# Patient Record
Sex: FEMALE | Race: WHITE
[De-identification: names, ages, dates, MRNs, and addresses within clinical notes are randomized per-mention and may not be internally consistent; named-entity substitution may affect disease eponyms.]

---

## 2018-07-09 VITALS — TEMPERATURE: 98.2 F | HEART RATE: 88 BPM | SYSTOLIC BLOOD PRESSURE: 121 MMHG | DIASTOLIC BLOOD PRESSURE: 56 MMHG

## 2018-07-25 VITALS — TEMPERATURE: 97.1 F | HEART RATE: 88 BPM | SYSTOLIC BLOOD PRESSURE: 131 MMHG | DIASTOLIC BLOOD PRESSURE: 73 MMHG

## 2018-08-22 VITALS — DIASTOLIC BLOOD PRESSURE: 75 MMHG | TEMPERATURE: 99.2 F | SYSTOLIC BLOOD PRESSURE: 118 MMHG | HEART RATE: 104 BPM

## 2018-09-19 VITALS — SYSTOLIC BLOOD PRESSURE: 99 MMHG | DIASTOLIC BLOOD PRESSURE: 41 MMHG | HEART RATE: 98 BPM | TEMPERATURE: 98.1 F

## 2018-10-03 ENCOUNTER — HOSPITAL ENCOUNTER (OUTPATIENT)
Dept: HOSPITAL 19 - EUO | Age: 78
LOS: 4 days | Discharge: HOME | End: 2018-10-07
Attending: NURSE PRACTITIONER
Payer: MEDICARE

## 2018-10-03 VITALS — WEIGHT: 175.27 LBS | HEIGHT: 60 IN | BODY MASS INDEX: 34.41 KG/M2

## 2018-10-03 DIAGNOSIS — L89.324: ICD-10-CM

## 2018-10-03 DIAGNOSIS — I96: Primary | ICD-10-CM

## 2018-10-03 DIAGNOSIS — L89.154: ICD-10-CM

## 2018-10-10 VITALS — DIASTOLIC BLOOD PRESSURE: 62 MMHG | SYSTOLIC BLOOD PRESSURE: 122 MMHG | HEART RATE: 104 BPM | TEMPERATURE: 97.5 F

## 2018-10-31 NOTE — NUR
CLIENT DID NOT SHOW FOR APPOINTMENT TODAY AFTER WAITING 20 MINUTES.
DAUGHTER, EMERGENCY CONTACTS, SON, AND [facility] ALL WERE CONTACTED
TO TRY TO REACH PATIENT.
SUPPLIES LEFT FOR CLIENT AT [facility] FOR CONTINUING HOME CARE DRESSING
CHANGES.
CLIENT WILL TRY TO BE CONTACTED FOR SCHEDULING A NEW FOLLOW UP.

## 2018-12-05 ENCOUNTER — HOSPITAL ENCOUNTER (OUTPATIENT)
Dept: HOSPITAL 19 - EUO | Age: 78
LOS: 34 days | Discharge: HOME | End: 2019-01-08
Attending: NURSE PRACTITIONER
Payer: MEDICARE

## 2018-12-05 VITALS — BODY MASS INDEX: 25.1 KG/M2 | WEIGHT: 127.87 LBS | HEIGHT: 60 IN

## 2018-12-05 VITALS — SYSTOLIC BLOOD PRESSURE: 106 MMHG | DIASTOLIC BLOOD PRESSURE: 60 MMHG | TEMPERATURE: 97.8 F | HEART RATE: 89 BPM

## 2018-12-05 DIAGNOSIS — L89.324: Primary | ICD-10-CM

## 2018-12-05 DIAGNOSIS — L89.154: ICD-10-CM

## 2018-12-05 DIAGNOSIS — Z48.00: ICD-10-CM

## 2018-12-05 NOTE — NUR
SEE DR. HATCH DICTATION
SACRUM- 2.1 CM x 3.0 CM x 2.2 CM
UNDERMINING- 2.7 CM
 
 
RIGHT GLUTE- 3.0 CM x 2.5 CM x 7.9 CM

## 2019-02-13 ENCOUNTER — HOSPITAL ENCOUNTER (OUTPATIENT)
Dept: HOSPITAL 19 - EUO | Age: 79
LOS: 14 days | Discharge: HOME | End: 2019-02-27
Attending: NURSE PRACTITIONER
Payer: MEDICARE

## 2019-02-13 VITALS — HEART RATE: 84 BPM | DIASTOLIC BLOOD PRESSURE: 61 MMHG | SYSTOLIC BLOOD PRESSURE: 122 MMHG

## 2019-02-13 VITALS — HEIGHT: 60 IN | WEIGHT: 282.19 LBS | BODY MASS INDEX: 55.4 KG/M2

## 2019-02-13 DIAGNOSIS — I96: Primary | ICD-10-CM

## 2019-02-13 DIAGNOSIS — L89.154: ICD-10-CM

## 2019-05-08 ENCOUNTER — HOSPITAL ENCOUNTER (OUTPATIENT)
Dept: HOSPITAL 19 - EUO | Age: 79
LOS: 90 days | Discharge: HOME | End: 2019-08-06
Attending: NURSE PRACTITIONER
Payer: MEDICARE

## 2019-05-08 VITALS — WEIGHT: 132.28 LBS | HEIGHT: 60 IN | BODY MASS INDEX: 25.97 KG/M2

## 2019-05-08 VITALS — DIASTOLIC BLOOD PRESSURE: 61 MMHG | HEART RATE: 78 BPM | SYSTOLIC BLOOD PRESSURE: 139 MMHG | TEMPERATURE: 98 F

## 2019-05-08 DIAGNOSIS — S31.809A: Primary | ICD-10-CM

## 2019-09-25 ENCOUNTER — HOSPITAL ENCOUNTER (OUTPATIENT)
Dept: HOSPITAL 19 - ZCOL.LAB | Age: 79
End: 2019-09-25
Attending: FAMILY MEDICINE
Payer: MEDICARE

## 2019-09-25 DIAGNOSIS — E11.9: Primary | ICD-10-CM

## 2019-09-25 LAB
ALBUMIN SERPL-MCNC: 3.2 GM/DL (ref 3.5–5)
ALP SERPL-CCNC: 60 U/L (ref 50–136)
ALT SERPL-CCNC: 9 U/L (ref 9–52)
ANION GAP SERPL CALC-SCNC: 9 MMOL/L (ref 7–16)
AST SERPL-CCNC: 28 U/L (ref 15–37)
BASOPHILS # BLD: 0 10*3/UL (ref 0–0.2)
BASOPHILS NFR BLD AUTO: 0.6 % (ref 0–2)
BILIRUB SERPL-MCNC: 0.2 MG/DL (ref 0–1)
BUN SERPL-MCNC: 24 MG/DL (ref 7–17)
CALCIUM SERPL-MCNC: 9 MG/DL (ref 8.4–10.2)
CHLORIDE SERPL-SCNC: 105 MMOL/L (ref 98–107)
CHOLEST SPEC-SCNC: 169 MG/DL (ref 120–200)
CHOLEST/HDLC SERPL-SRTO: 4.6
CO2 SERPL-SCNC: 27 MMOL/L (ref 22–30)
CREAT SERPL-SCNC: 0.38 UMOL/L (ref 0.52–1.25)
EOSINOPHIL # BLD: 0.1 10*3/UL (ref 0–0.7)
EOSINOPHIL NFR BLD: 1.1 % (ref 0–4)
ERYTHROCYTE [DISTWIDTH] IN BLOOD BY AUTOMATED COUNT: 19.8 % (ref 11.5–14.5)
GLUCOSE SERPL-MCNC: 213 MG/DL (ref 74–106)
GRANULOCYTES # BLD AUTO: 73.3 % (ref 42.2–75.2)
HCT VFR BLD AUTO: 40.1 % (ref 37–47)
HDLC SERPL-MCNC: 36 MG/DL
HGB BLD-MCNC: 12.2 G/DL (ref 12.5–16)
LDLC SERPL-MCNC: 119 MG/DL
LYMPHOCYTES # BLD: 1.1 10*3/UL (ref 1.2–3.4)
LYMPHOCYTES NFR BLD: 16.7 % (ref 20–51)
MCH RBC QN AUTO: 24 PG (ref 27–31)
MCHC RBC AUTO-ENTMCNC: 30 G/DL (ref 33–37)
MCV RBC AUTO: 79 FL (ref 80–100)
MONOCYTES # BLD: 0.5 10*3/UL (ref 0.1–0.6)
MONOCYTES NFR BLD AUTO: 8 % (ref 1.7–9.3)
NEUTROPHILS # BLD: 4.9 10*3/UL (ref 1.4–6.5)
PLATELET # BLD AUTO: 261 K/MM3 (ref 130–400)
PMV BLD AUTO: 12.3 FL (ref 7.4–10.4)
POTASSIUM SERPL-SCNC: 4.3 MMOL/L (ref 3.4–5)
PROT SERPL-MCNC: 6.6 GM/DL (ref 6.4–8.2)
RBC # BLD AUTO: 5.07 M/MM3 (ref 4.1–5.3)
SODIUM SERPL-SCNC: 141 MMOL/L (ref 137–145)
TRIGL SERPL-MCNC: 70 MG/DL

## 2020-01-27 ENCOUNTER — HOSPITAL ENCOUNTER (OUTPATIENT)
Dept: HOSPITAL 19 - ZCOL.LAB | Age: 80
End: 2020-01-27
Attending: FAMILY MEDICINE
Payer: MEDICARE

## 2020-01-27 DIAGNOSIS — E11.9: ICD-10-CM

## 2020-01-27 DIAGNOSIS — D64.9: Primary | ICD-10-CM

## 2020-01-27 LAB
ALBUMIN SERPL-MCNC: 3.4 GM/DL (ref 3.5–5)
ALP SERPL-CCNC: 58 U/L (ref 50–136)
ALT SERPL-CCNC: 16 U/L (ref 9–52)
ANION GAP SERPL CALC-SCNC: 8 MMOL/L (ref 7–16)
AST SERPL-CCNC: 27 U/L (ref 15–37)
BASOPHILS # BLD: 0 10*3/UL (ref 0–0.2)
BASOPHILS NFR BLD AUTO: 0.7 % (ref 0–2)
BILIRUB SERPL-MCNC: 0.3 MG/DL (ref 0–1)
BUN SERPL-MCNC: 31 MG/DL (ref 7–17)
CALCIUM SERPL-MCNC: 8.9 MG/DL (ref 8.4–10.2)
CHLORIDE SERPL-SCNC: 107 MMOL/L (ref 98–107)
CO2 SERPL-SCNC: 25 MMOL/L (ref 22–30)
CREAT SERPL-SCNC: 0.4 UMOL/L (ref 0.52–1.25)
EOSINOPHIL # BLD: 0.1 10*3/UL (ref 0–0.7)
EOSINOPHIL NFR BLD: 1 % (ref 0–4)
ERYTHROCYTE [DISTWIDTH] IN BLOOD BY AUTOMATED COUNT: 19.2 % (ref 11.5–14.5)
GLUCOSE SERPL-MCNC: 152 MG/DL (ref 74–106)
GRANULOCYTES # BLD AUTO: 72.5 % (ref 42.2–75.2)
HCT VFR BLD AUTO: 40 % (ref 37–47)
HGB BLD-MCNC: 12.3 G/DL (ref 12.5–16)
IRON SERPL-MCNC: 28 UG/DL (ref 35–150)
LYMPHOCYTES # BLD: 1 10*3/UL (ref 1.2–3.4)
LYMPHOCYTES NFR BLD: 18.2 % (ref 20–51)
MCH RBC QN AUTO: 25 PG (ref 27–31)
MCHC RBC AUTO-ENTMCNC: 31 G/DL (ref 33–37)
MCV RBC AUTO: 81 FL (ref 80–100)
MONOCYTES # BLD: 0.4 10*3/UL (ref 0.1–0.6)
MONOCYTES NFR BLD AUTO: 7.3 % (ref 1.7–9.3)
NEUTROPHILS # BLD: 4.1 10*3/UL (ref 1.4–6.5)
PLATELET # BLD AUTO: 292 K/MM3 (ref 130–400)
PMV BLD AUTO: 12.4 FL (ref 7.4–10.4)
POTASSIUM SERPL-SCNC: 4.2 MMOL/L (ref 3.4–5)
PROT SERPL-MCNC: 6.7 GM/DL (ref 6.4–8.2)
RBC # BLD AUTO: 4.95 M/MM3 (ref 4.1–5.3)
SODIUM SERPL-SCNC: 139 MMOL/L (ref 137–145)
TIBC SERPL-MCNC: 357 UG/DL (ref 265–497)